# Patient Record
Sex: MALE | Race: BLACK OR AFRICAN AMERICAN
[De-identification: names, ages, dates, MRNs, and addresses within clinical notes are randomized per-mention and may not be internally consistent; named-entity substitution may affect disease eponyms.]

---

## 2019-11-04 ENCOUNTER — HOSPITAL ENCOUNTER (INPATIENT)
Dept: HOSPITAL 74 - YASAS | Age: 54
LOS: 4 days | Discharge: HOME | DRG: 772 | End: 2019-11-08
Attending: NEUROMUSCULOSKELETAL MEDICINE & OMM | Admitting: NEUROMUSCULOSKELETAL MEDICINE & OMM
Payer: COMMERCIAL

## 2019-11-04 VITALS — BODY MASS INDEX: 34.3 KG/M2

## 2019-11-04 DIAGNOSIS — F17.210: ICD-10-CM

## 2019-11-04 DIAGNOSIS — E11.65: ICD-10-CM

## 2019-11-04 DIAGNOSIS — F16.20: ICD-10-CM

## 2019-11-04 DIAGNOSIS — Z79.84: ICD-10-CM

## 2019-11-04 DIAGNOSIS — E78.5: ICD-10-CM

## 2019-11-04 DIAGNOSIS — F10.282: ICD-10-CM

## 2019-11-04 DIAGNOSIS — I10: ICD-10-CM

## 2019-11-04 DIAGNOSIS — F10.20: Primary | ICD-10-CM

## 2019-11-04 DIAGNOSIS — J45.909: ICD-10-CM

## 2019-11-04 DIAGNOSIS — G47.39: ICD-10-CM

## 2019-11-04 DIAGNOSIS — E66.9: ICD-10-CM

## 2019-11-05 LAB
ALBUMIN SERPL-MCNC: 3 G/DL (ref 3.4–5)
ALP SERPL-CCNC: 206 U/L (ref 45–117)
ALT SERPL-CCNC: 35 U/L (ref 13–61)
ANION GAP SERPL CALC-SCNC: 7 MMOL/L (ref 8–16)
APPEARANCE UR: CLEAR
AST SERPL-CCNC: 11 U/L (ref 15–37)
BILIRUB SERPL-MCNC: 0.3 MG/DL (ref 0.2–1)
BILIRUB UR STRIP.AUTO-MCNC: NEGATIVE MG/DL
BUN SERPL-MCNC: 14.4 MG/DL (ref 7–18)
CALCIUM SERPL-MCNC: 8.6 MG/DL (ref 8.5–10.1)
CHLORIDE SERPL-SCNC: 105 MMOL/L (ref 98–107)
CO2 SERPL-SCNC: 26 MMOL/L (ref 21–32)
COLOR UR: YELLOW
CREAT SERPL-MCNC: 1.1 MG/DL (ref 0.55–1.3)
DEPRECATED RDW RBC AUTO: 14.3 % (ref 11.9–15.9)
GLUCOSE SERPL-MCNC: 241 MG/DL (ref 74–106)
HCT VFR BLD CALC: 40.3 % (ref 35.4–49)
HGB BLD-MCNC: 13 GM/DL (ref 11.7–16.9)
KETONES UR QL STRIP: NEGATIVE
LEUKOCYTE ESTERASE UR QL STRIP.AUTO: NEGATIVE
MCH RBC QN AUTO: 29.9 PG (ref 25.7–33.7)
MCHC RBC AUTO-ENTMCNC: 32.3 G/DL (ref 32–35.9)
MCV RBC: 92.6 FL (ref 80–96)
NITRITE UR QL STRIP: NEGATIVE
PH UR: 5.5 [PH] (ref 5–8)
PLATELET # BLD AUTO: 347 K/MM3 (ref 134–434)
PMV BLD: 9.1 FL (ref 7.5–11.1)
POTASSIUM SERPLBLD-SCNC: 4.2 MMOL/L (ref 3.5–5.1)
PROT SERPL-MCNC: 6.1 G/DL (ref 6.4–8.2)
PROT UR QL STRIP: (no result)
PROT UR QL STRIP: NEGATIVE
RBC # BLD AUTO: 4.35 M/MM3 (ref 4–5.6)
SODIUM SERPL-SCNC: 138 MMOL/L (ref 136–145)
SP GR UR: 1.02 (ref 1.01–1.03)
UROBILINOGEN UR STRIP-MCNC: 0.2 MG/DL (ref 0.2–1)
WBC # BLD AUTO: 11.9 K/MM3 (ref 4–10)

## 2019-11-05 PROCEDURE — HZ42ZZZ GROUP COUNSELING FOR SUBSTANCE ABUSE TREATMENT, COGNITIVE-BEHAVIORAL: ICD-10-PCS | Performed by: ALLERGY & IMMUNOLOGY

## 2019-11-05 RX ADMIN — NICOTINE SCH MG: 14 PATCH, EXTENDED RELEASE TRANSDERMAL at 10:25

## 2019-11-05 RX ADMIN — Medication SCH: at 21:47

## 2019-11-05 RX ADMIN — METFORMIN HYDROCHLORIDE SCH MG: 500 TABLET ORAL at 07:04

## 2019-11-05 RX ADMIN — METFORMIN HYDROCHLORIDE SCH MG: 500 TABLET ORAL at 16:49

## 2019-11-05 RX ADMIN — AMLODIPINE BESYLATE SCH MG: 5 TABLET ORAL at 10:25

## 2019-11-05 RX ADMIN — ASPIRIN 81 MG SCH MG: 81 TABLET ORAL at 10:24

## 2019-11-05 RX ADMIN — Medication SCH TAB: at 10:24
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
## 2019-11-05 NOTE — HP
CIWA Score





- Admission Criteria


[facility] Guidelines: Admission for Medically Managed Detox: 


Requires at least one of the followin. CIWA greater than 12


2. Seizures within the past 24 hours


3. Delirium tremens within the past 24 hours


4. Hallucinations within the past 24 hours


5. Acute intervention needed for co  occurring medical disorder


6. Acute intervention needed for co  occurring psychiatric disorder


7. Severe withdrawal that cannot be handled at a lower level of care (continued


    vomiting, continued diarrhea, abnormal vital signs) requiring intravenous


    medication and/or fluids


8. Pregnancy








Admission ROS S





- HPI


Chief Complaint: 





Seeking admission to Rehab


Allergies/Adverse Reactions: 


 Allergies











Allergy/AdvReac Type Severity Reaction Status Date / Time


 


No Known Allergies Allergy   Verified 19 20:01











History of Present Illness: 





54 years old male with a long history of alcohol dependence is seeking 

admission to Rehab. This is his first admission to [facility] and to Rehab.. He has 

medical history of sleep apnea, hypertension, hyperlipidemia, DM Type 2 and 

asthma.He denies suicidal ideation at this time.


Exam Limitations: No Limitations





- Ebola screening


Have you traveled outside of the country in the last 21 days: No (N)


Have you had contact with anyone from an Ebola affected area: No


Do you have a fever: No





- Review of Systems


Constitutional: No Symptoms Reported


EENT: reports: No Symptoms Reported


Respiratory: reports: No Symptoms reported


Cardiac: reports: No Symptoms Reported


GI: reports: No Symptoms Reported


: reports: No Symptoms Reported


Musculoskeletal: reports: No Symptoms Reported


Integumentary: reports: No Symptoms Reported


Neuro: reports: No Symptoms reported


Endocrine: reports: No Symptoms Reported


Hematology: reports: No Symptoms Reported


Psychiatric: reports: Judgement Intact, Mood/Affect Appropiate, Orientated x3


Other Systems: Reviewed and Negative





Patient History





- Patient Medical History


Hx Anemia: No


Hx Asthma: Yes (Albuterol)


Hx Chronic Obstructive Pulmonary Disease (COPD): No


Hx Cancer: No


Hx Cardiac Disorders: No


Hx Congestive Heart Failure: No


Hx Hypertension: Yes


Hx Hypercholesterolemia: Yes


Hx Pacemaker: No


HX Cerebrovascular Accident: No


Hx Seizures: No


Hx Dementia: No


Hx Diabetes: Yes


Hx Gastrointestinal Disorders: No


Hx Liver Disease: No


Hx Genitourinary Disorders: No


Hx Sexually Transmitted Disorders: No


Hx Renal Disease (ESRD): No


Hx Thyroid Disease: No


Hx Human Immunodeficiency Virus (HIV): No (Negative 2018)


Hx Hepatitis C: No


Hx Depression: Yes


Hx Suicide Attempt: No (Denies suicidal ideation at this time)


Hx Bipolar Disorder: No


Hx Schizophrenia: No





- Patient Surgical History


Past Surgical History: Yes


Other Surgical History: SPINE SURGERY





- PPD History


Previous Implant?: No


Documented Results: Negative w/o proof


Implanted On Prior [facility] Admission?: No


PPD to be Administered?: Yes





- Reproductive History


Patient is a Female of Child Bearing Age (11 -55 yrs old): No (male)





- Smoking Cessation


Smoking history: Current every day smoker


Have you smoked in the past 12 months: Yes


Aproximately how many cigarettes per day: 4


Hx Chewing Tobacco Use: No


Initiated information on smoking cessation: Yes


'Breaking Loose' booklet given: 19





- Substance & Tx. History


Hx Alcohol Use: No


Hx Substance Use: No


Substance Use Type: None


Hx Substance Use Treatment: Yes ([facility])





- Substances abused


  ** PCP


Substance route: Smoking


Frequency: Daily


Amount used: 2 bags


Age of first use: 20


Date of last use: 10/28/19





  ** Alcohol


Substance route: Oral


Frequency: Daily


Amount used: 2 beers.


Age of first use: 20


Date of last use: 10/28/19





Admission Physical Exam [facility]





- Vital Signs


Vital Signs: 


 Vital Signs - 24 hr











  19





  20:00


 


Temperature 98.2 F


 


Pulse Rate 111 H


 


Respiratory 16





Rate 


 


Blood Pressure 129/81














- Physical


General Appearance: Yes: Within Normal Limits


HEENTM: Yes: Within Normal Limits


Respiratory: Yes: Within Normal Limits


Neck: Yes: Within Normal Limits


Breast: Yes: Within Normal Limits


Cardiology: Yes: Within Normal Limits


Abdominal: Yes: Within Normal Limits


Genitourinary: Yes: Within Normal Limits


Back: Yes: Within Normal Limits


Musculoskeletal: Yes: Within Normal Limits


Extremities: Yes: Within Normal Limits


Neurological: Yes: Within Normal Limits


Integumentary: Yes: Within Normal Limits


Lymphatic: Yes: Within Normal Limits





- Diagnostic


(1) Alcohol dependence


Current Visit: Yes   Status: Acute   





(2) PCP dependence


Current Visit: Yes   Status: Acute   





(3) Sleep apnea


Current Visit: Yes   Status: Acute   





(4) Hypertension


Current Visit: Yes   Status: Acute   





(5) Hyperlipidemia


Current Visit: Yes   Status: Acute   





(6) Diabetes type 2, uncontrolled


Current Visit: Yes   Status: Acute   





(7) Asthma


Current Visit: Yes   Status: Acute   





Cleared for Admission [facility]





- Detox or Rehab


[facility] Level of Care: Observation Bed


Claeared for Rehab Admission: Yes





Breathalyzer





- Breathalyzer


Breathalyzer: 0





Urine Drug Screen





- Test Device


Lot number: [id number]


Expiration date: 21





- Control


Is test valid?: Yes





- Results


Drug screen NEGATIVE: Yes





Inpatient Rehab Admission





- Rehab Decision to Admit


Inpatient rehab admission?: Yes





- Initial Determination


Are CD services needed?: No


Free of communicable disease: Yes


Not in need of hospitalization: Yes





- Rehab Admission Criteria


Previous failed treatment: Yes


Poor recovery environment: Yes


Comorbidities: Yes


Lacks judgement: No


Patient is meeting Inpatient Rehab admission criteria:: Yes

## 2019-11-05 NOTE — EKG
Test Reason : 

Blood Pressure : ***/*** mmHG

Vent. Rate : 100 BPM     Atrial Rate : 100 BPM

   P-R Int : 156 ms          QRS Dur : 088 ms

    QT Int : 342 ms       P-R-T Axes : 037 035 027 degrees

   QTc Int : 441 ms

 

NORMAL SINUS RHYTHM

NONSPECIFIC T WAVE ABNORMALITY

RSR' pattern in V1 and V2

ABNORMAL ECG

NO PREVIOUS ECGS AVAILABLE

Confirmed by MD Steven, Junior ([id number]) on 11/5/2019 3:02:03 PM

 

Referred By: CAMILLA LUI           Confirmed By:Junior Harris MD

## 2019-11-06 RX ADMIN — Medication SCH MG: at 21:41

## 2019-11-06 RX ADMIN — ALBUTEROL SULFATE PRN PUFF: 90 AEROSOL, METERED RESPIRATORY (INHALATION) at 22:46

## 2019-11-06 RX ADMIN — NICOTINE SCH MG: 14 PATCH, EXTENDED RELEASE TRANSDERMAL at 10:38

## 2019-11-06 RX ADMIN — Medication SCH TAB: at 10:37

## 2019-11-06 RX ADMIN — AMLODIPINE BESYLATE SCH MG: 5 TABLET ORAL at 10:37

## 2019-11-06 RX ADMIN — ALBUTEROL SULFATE PRN PUFF: 90 AEROSOL, METERED RESPIRATORY (INHALATION) at 18:00

## 2019-11-06 RX ADMIN — ASPIRIN 81 MG SCH MG: 81 TABLET ORAL at 10:37

## 2019-11-06 RX ADMIN — METFORMIN HYDROCHLORIDE SCH MG: 500 TABLET ORAL at 17:00

## 2019-11-06 RX ADMIN — METFORMIN HYDROCHLORIDE SCH MG: 500 TABLET ORAL at 06:27

## 2019-11-06 NOTE — PN
Progress Note (short form)





- Note


Progress Note: 


Received call from 5N nurse: 





54 y.o. M PMH PCP abuse, asthma, sleep apnea, hypertension, hyperlipidemia, DM 

Type 2. 


Patient stood up quickly after eating dinner, became dizzy & short of breath. 

On arrival patient was sitting in the hallway in a chair. States he felt 

nauseous and lightheaded with unsteady gait, and short of breath. NBNB spit-up w

/ dry heaves x 2 episodes.





On assessment:


Vitals: BP 150s/90s, tachy to 110, afebrile





Gen:Alert, oriented. Speaking in clear sentences. 


Resp: B/l wheezing upper lung fields. No accessory muscle use.


CV: Tachycardic, hypertensive. 





#Plan:


-Pt take albuterol prn at home-- giving 1 puff alb inhaler


-Vistaril 25mg PO for nausea/ vomiting


-Recheck vitals in 20 minutes


-Fall precautions implemented

## 2019-11-07 RX ADMIN — METFORMIN HYDROCHLORIDE SCH MG: 500 TABLET ORAL at 06:35

## 2019-11-07 RX ADMIN — METFORMIN HYDROCHLORIDE SCH MG: 500 TABLET ORAL at 16:42

## 2019-11-07 RX ADMIN — Medication SCH TAB: at 10:35

## 2019-11-07 RX ADMIN — Medication SCH MG: at 21:43

## 2019-11-07 RX ADMIN — AMLODIPINE BESYLATE SCH MG: 5 TABLET ORAL at 10:35

## 2019-11-07 RX ADMIN — NICOTINE SCH MG: 14 PATCH, EXTENDED RELEASE TRANSDERMAL at 10:35

## 2019-11-07 RX ADMIN — ALBUTEROL SULFATE PRN PUFF: 90 AEROSOL, METERED RESPIRATORY (INHALATION) at 10:36

## 2019-11-07 RX ADMIN — ASPIRIN 81 MG SCH MG: 81 TABLET ORAL at 10:35

## 2019-11-07 NOTE — PN
Teaching Attending Note


Name of Resident: Izabela Masters





ATTENDING PHYSICIAN STATEMENT





I saw and evaluated the patient.


I reviewed the resident's note and discussed the case with the resident.


I agree with the resident's findings and plan as documented.








SUBJECTIVE:


Agree with resident's findings





OBJECTIVE:


Agree with resident's objective findings





ASSESSMENT AND PLAN:


Agree with action and plan.


Dr. Alves

## 2019-11-08 VITALS — HEART RATE: 99 BPM | DIASTOLIC BLOOD PRESSURE: 76 MMHG | SYSTOLIC BLOOD PRESSURE: 138 MMHG

## 2019-11-08 VITALS — TEMPERATURE: 97.6 F

## 2019-11-08 RX ADMIN — Medication SCH TAB: at 11:18

## 2019-11-08 RX ADMIN — AMLODIPINE BESYLATE SCH: 5 TABLET ORAL at 11:19

## 2019-11-08 RX ADMIN — METFORMIN HYDROCHLORIDE SCH MG: 500 TABLET ORAL at 06:30

## 2019-11-08 RX ADMIN — NICOTINE SCH: 14 PATCH, EXTENDED RELEASE TRANSDERMAL at 11:18

## 2019-11-08 RX ADMIN — ASPIRIN 81 MG SCH MG: 81 TABLET ORAL at 11:18
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
## 2019-11-08 NOTE — DS
[facility] Rehab Discharge Summary





- [facility] Rehab Discharge Summary


Admission Date: 11/05/19


Discharge Date: 11/08/19





- History


Present History: Alcohol dependence, PCP dependence


Additional Comments: 





Pt is a 53 y/o male admitted to rehab for Alcohol use disorder after spending 

some rehab days at [facility].. and referred here to complete treatment as ... was 

unable to manage his sleep apnea condition  per patient's account. Pt requests 

to discharge today stating he had 3-4 days to complete treatment at [facility] and 

therefore is ready to be referred to [facility]. Pt reports he has a primary care 

provider, Dr. Benz on [address], [place] for medical management. Pt met 

with his counselor and has been referred to [facility] on [address] [place], [place] for CD aftercare treatment. 


Pertinent Past History: 





Asthma


HTN


DM


Sleep Apnea


Obesity





- Discharge Physical Exam


Vital Signs: 


 Vital Signs











Temperature  97.6 F   11/08/19 07:52


 


Pulse Rate  106 H  11/08/19 07:52


 


Respiratory Rate  18   11/08/19 07:52


 


Blood Pressure  157/97   11/08/19 07:52


 


O2 Sat by Pulse Oximetry (%)      








Alert o x 3


nad


oob ambulating with steady gait


cardiac:s1 s2, slightly tachycardia


lungs:cta,lulu,no wheeze or rhonchi


abdomen:++++fatty,+bs,nt


extremities/skin:no edema,full ROM,skin intact. Left ring finger brace intact. 

Acyanotic.


Pertinent Admission Physical Exam Findings: 





 Laboratory Tests











  11/05/19 11/05/19 11/05/19





  06:59 08:25 08:25


 


WBC   11.9 H 


 


RBC   4.35 


 


Hgb   13.0 


 


Hct   40.3 


 


MCV   92.6 


 


MCH   29.9 


 


MCHC   32.3 


 


RDW   14.3 


 


Plt Count   347 


 


MPV   9.1 


 


Sodium    138


 


Potassium    4.2


 


Chloride    105


 


Carbon Dioxide    26


 


Anion Gap    7 L


 


BUN    14.4


 


Creatinine    1.1


 


Est GFR (CKD-EPI)AfAm    88.36


 


Est GFR (CKD-EPI)NonAf    76.24


 


POC Glucometer  183  


 


Random Glucose    241 H


 


Calcium    8.6


 


Total Bilirubin    0.3


 


AST    11 L


 


ALT    35


 


Alkaline Phosphatase    206 H


 


Total Protein    6.1 L


 


Albumin    3.0 L


 


Urine Color   


 


Urine Appearance   


 


Urine pH   


 


Ur Specific Gravity   


 


Urine Protein   


 


Urine Glucose (UA)   


 


Urine Ketones   


 


Urine Blood   


 


Urine Nitrite   


 


Urine Bilirubin   


 


Urine Urobilinogen   


 


Ur Leukocyte Esterase   


 


RPR Titer   














  11/05/19 11/05/19 11/05/19





  08:25 11:10 16:50


 


WBC   


 


RBC   


 


Hgb   


 


Hct   


 


MCV   


 


MCH   


 


MCHC   


 


RDW   


 


Plt Count   


 


MPV   


 


Sodium   


 


Potassium   


 


Chloride   


 


Carbon Dioxide   


 


Anion Gap   


 


BUN   


 


Creatinine   


 


Est GFR (CKD-EPI)AfAm   


 


Est GFR (CKD-EPI)NonAf   


 


POC Glucometer    215


 


Random Glucose   


 


Calcium   


 


Total Bilirubin   


 


AST   


 


ALT   


 


Alkaline Phosphatase   


 


Total Protein   


 


Albumin   


 


Urine Color   Yellow 


 


Urine Appearance   Clear 


 


Urine pH   5.5 


 


Ur Specific Gravity   1.019 


 


Urine Protein   Negative 


 


Urine Glucose (UA)   2+ H 


 


Urine Ketones   Negative 


 


Urine Blood   Negative 


 


Urine Nitrite   Negative 


 


Urine Bilirubin   Negative 


 


Urine Urobilinogen   0.2 


 


Ur Leukocyte Esterase   Negative 


 


RPR Titer  Nonreactive  














  11/06/19 11/06/19 11/07/19





  06:27 16:59 06:33


 


WBC   


 


RBC   


 


Hgb   


 


Hct   


 


MCV   


 


MCH   


 


MCHC   


 


RDW   


 


Plt Count   


 


MPV   


 


Sodium   


 


Potassium   


 


Chloride   


 


Carbon Dioxide   


 


Anion Gap   


 


BUN   


 


Creatinine   


 


Est GFR (CKD-EPI)AfAm   


 


Est GFR (CKD-EPI)NonAf   


 


POC Glucometer  105  142  118


 


Random Glucose   


 


Calcium   


 


Total Bilirubin   


 


AST   


 


ALT   


 


Alkaline Phosphatase   


 


Total Protein   


 


Albumin   


 


Urine Color   


 


Urine Appearance   


 


Urine pH   


 


Ur Specific Gravity   


 


Urine Protein   


 


Urine Glucose (UA)   


 


Urine Ketones   


 


Urine Blood   


 


Urine Nitrite   


 


Urine Bilirubin   


 


Urine Urobilinogen   


 


Ur Leukocyte Esterase   


 


RPR Titer   














  11/07/19 11/08/19





  16:43 06:29


 


WBC  


 


RBC  


 


Hgb  


 


Hct  


 


MCV  


 


MCH  


 


MCHC  


 


RDW  


 


Plt Count  


 


MPV  


 


Sodium  


 


Potassium  


 


Chloride  


 


Carbon Dioxide  


 


Anion Gap  


 


BUN  


 


Creatinine  


 


Est GFR (CKD-EPI)AfAm  


 


Est GFR (CKD-EPI)NonAf  


 


POC Glucometer  165  115


 


Random Glucose  


 


Calcium  


 


Total Bilirubin  


 


AST  


 


ALT  


 


Alkaline Phosphatase  


 


Total Protein  


 


Albumin  


 


Urine Color  


 


Urine Appearance  


 


Urine pH  


 


Ur Specific Gravity  


 


Urine Protein  


 


Urine Glucose (UA)  


 


Urine Ketones  


 


Urine Blood  


 


Urine Nitrite  


 


Urine Bilirubin  


 


Urine Urobilinogen  


 


Ur Leukocyte Esterase  


 


RPR Titer  








Left ring finger on a removable finger brace from previous injury before 

admission.





- Treatment


Discharge Condition: Discharge condition good


Hospital Course: 





rehabilitated safely.


CD aftercare referral accepted.





- Medication


Discharge Medications: 


Ambulatory Orders





Albuterol Sulfate Inhaler - [Ventolin HFA Inhaler -] 2 puff PO Q4HWA PRN 11/04/ 19 


Amlodipine Besylate [Norvasc -] 5 mg PO DAILY 11/04/19 


Aspirin [ASA -] 1 tablet PO DAILY 11/04/19 


Lisinopril [Prinivil -] 40 mg PO DAILY 11/08/19 


Metformin HCl [Glucophage] 500 mg PO BID #60 tablet 11/08/19 


Montelukast Sodium [Singulair] 10 mg PO HS 11/08/19 











- Medication-Assisted Treatment (MAT)


Medication-Assisted Treatment (MAT): No





- Discharge Instructions


Diet, activity, other medical instructions: 





Diet:GALE/NCS





Activity: oob ad cholo





Other medical instructions:Follow up with primary care provider, Dr. Benz on 

[address], [place], [place]  for medical management within 1 week after 

discharge(pt states he will walk in)


Follow up with Sleep medicine appointment after  referral by your primary care 

doctor.


Follow up with Nutritionist after referral from your primary care doctor.


Follow up with CD aftercare as recommended and scheduled at [facility].








- Diagnosis


(1) Alcohol dependence


Current Visit: Yes   Status: Chronic   


Qualifiers: 


   Substance use status: uncomplicated   Qualified Code(s): F10.20 - Alcohol 

dependence, uncomplicated   





(2) PCP dependence


Current Visit: Yes   Status: Chronic   





(3) Asthma


Current Visit: Yes   Status: Chronic   


Qualifiers: 


   Asthma severity: mild   Asthma persistence: unspecified   Asthma 

complication type: unspecified   Qualified Code(s): J45.909 - Unspecified asthma

, uncomplicated   





(4) Diabetes type 2, uncontrolled


Current Visit: Yes   Status: Chronic   


Qualifiers: 


   Glycemic state: with hyperglycemia   Qualified Code(s): E11.65 - Type 2 

diabetes mellitus with hyperglycemia   





(5) Hyperlipidemia


Current Visit: Yes   Status: Chronic   


Qualifiers: 


   Hyperlipidemia type: unspecified   Qualified Code(s): E78.5 - Hyperlipidemia

, unspecified   





(6) Hypertension


Current Visit: Yes   Status: Chronic   


Qualifiers: 


   Hypertension type: essential hypertension   Qualified Code(s): I10 - 

Essential (primary) hypertension   





(7) Nicotine dependence


Current Visit: Yes   Status: Chronic   


Qualifiers: 


   Nicotine product type: cigarettes   Substance use status: uncomplicated   

Qualified Code(s): F17.210 - Nicotine dependence, cigarettes, uncomplicated   





(8) Sleep apnea


Current Visit: Yes   Status: Chronic   


Qualifiers: 


   Sleep apnea type: unspecified type   Qualified Code(s): G47.30 - Sleep apnea

, unspecified   





(9) Obesity (BMI 30.0-34.9)


Current Visit: Yes   Status: Chronic   





- Follow-up Referral


Minutes to complete discharge: 30





- AMA


Did Patient Leave Against Medical Advice: No


Additional Comments: 





This writer called pt's home pharmacy, [address] on [place], [place]  who verified pt's Rx is waiting to be picked up. Pt had all meds sent to 

pharmacy from [facility] upon discharge. However,  pharmacist, [facility] pt needs new Rx for metformin 1000 mg po daily. As see above, metformin  

has been electronically sent to said pharmacy. Pt has been instructed to follow 

up with his primary care for further treatment/management.